# Patient Record
Sex: FEMALE | Race: WHITE | NOT HISPANIC OR LATINO | Employment: OTHER | URBAN - METROPOLITAN AREA
[De-identification: names, ages, dates, MRNs, and addresses within clinical notes are randomized per-mention and may not be internally consistent; named-entity substitution may affect disease eponyms.]

---

## 2023-02-03 ENCOUNTER — ANNUAL EXAM (OUTPATIENT)
Dept: OBGYN CLINIC | Facility: CLINIC | Age: 48
End: 2023-02-03

## 2023-02-03 VITALS
DIASTOLIC BLOOD PRESSURE: 68 MMHG | HEIGHT: 65 IN | SYSTOLIC BLOOD PRESSURE: 118 MMHG | WEIGHT: 129.8 LBS | BODY MASS INDEX: 21.63 KG/M2

## 2023-02-03 DIAGNOSIS — Z12.4 SCREENING FOR CERVICAL CANCER: ICD-10-CM

## 2023-02-03 DIAGNOSIS — Z12.11 SCREEN FOR COLON CANCER: ICD-10-CM

## 2023-02-03 DIAGNOSIS — Z12.31 BREAST CANCER SCREENING BY MAMMOGRAM: ICD-10-CM

## 2023-02-03 DIAGNOSIS — Z01.419 WOMEN'S ANNUAL ROUTINE GYNECOLOGICAL EXAMINATION: Primary | ICD-10-CM

## 2023-02-03 DIAGNOSIS — E06.3 HASHIMOTO'S THYROIDITIS: ICD-10-CM

## 2023-02-03 PROBLEM — Z97.5 IUD (INTRAUTERINE DEVICE) IN PLACE: Status: ACTIVE | Noted: 2023-02-03

## 2023-02-03 NOTE — PROGRESS NOTES
[address], [address], [facility], [address] [facility]    ASSESSMENT/PLAN: Edwin Lala is a 50 y o  [id number] who presents for annual gynecologic exam     Encounter for routine gynecologic examination  - Routine well woman exam completed today  - Cervical Cancer Screening: Current ASCCP Guidelines reviewed  Last Pap: 12/08/2020  History of abnormal: Yes  Overdue for repeat pap  Pap collected today   - STI screening offered including HIV testing: offered, pt declined  - Contraceptive counseling discussed  Current contraception: IUD, satisfied  Due for removal of IUD 1/2027:   - Breast Cancer Screening: Last Mammogram Not on file  Overdue for repeat  Order provided today  - Colorectal cancer screening was ordered  - The following were reviewed in today's visit: breast self exam, mammography screening ordered, menopause, exercise, healthy diet and colonoscopy discussed and ordered    Additional problems addressed during this visit:  1  Women's annual routine gynecological examination    2  Breast cancer screening by mammogram  -     Mammo screening bilateral w 3d & cad; Future    3  Screening for cervical cancer  -     IGP, Aptima HPV, Rfx 16/18,45    4  Hashimoto's thyroiditis  Assessment & Plan:  - Currently on no medications  Has not had TSH checked recently  Repeat ordered today  Reviewed that if abnormal, would recommend follow up with PCP for further management  Orders:  -     TSH, 3rd generation with Free T4 reflex; Future  -     TSH, 3rd generation with Free T4 reflex    5  Screen for colon cancer  -     Ambulatory Referral to Gastroenterology; Future      CC:  Annual Gynecologic Examination    HPI: Edwin Lala is a 50 y o  [id number] who presents for annual gynecologic examination  She is without complaint today  ROS: Negative except as noted in HPI    No LMP recorded  Patient has had an implant    Menstrual History  Period Pattern:  (Amenorrheic in setting of LNG-IUD)    She reports that she is not currently sexually active and has had partner(s) who are male  She reports using the following method of birth control/protection: I U D   Sexual History  Sexual Assault: No  Sexually Transmitted Infection History: None  Multiple Sex Partners: No  Is Patient in a Monogamous Relationship?: No    The following portions of the patient's history were reviewed and updated as appropriate:   Past Medical History:   Diagnosis Date   • Abnormal cervical Papanicolaou smear     leep times 2   • B12 deficiency    • Hashimoto's disease    • Hypothyroidism      Past Surgical History:   Procedure Laterality Date   • CERVICAL BIOPSY  W/ LOOP ELECTRODE EXCISION  2003   • CERVICAL BIOPSY  W/ LOOP ELECTRODE EXCISION  02/22/2017   • HI CONIZATION CERVIX W/WO D&C RPR KNIFE/LASER       Family History   Problem Relation Age of Onset   • No Known Problems Mother    • Heart disease Father    • Diabetes Father    • Prostate cancer Father    • Obesity Father    • Alcohol abuse Father    • No Known Problems Sister    • Alcohol abuse Brother    • Cirrhosis Brother    • COPD Maternal Grandfather    • Liver cancer Paternal Grandmother    • Prostate cancer Paternal Grandfather    • No Known Problems Daughter    • No Known Problems Son    • Allergies Son    • Breast cancer Neg Hx    • Ovarian cancer Neg Hx    • Uterine cancer Neg Hx    • Colon cancer Neg Hx      Social History     Socioeconomic History   • Marital status: /Civil Union     Spouse name: None   • Number of children: None   • Years of education: None   • Highest education level: None   Occupational History   • None   Tobacco Use   • Smoking status: Former     Types: Cigarettes   • Smokeless tobacco: Never   Vaping Use   • Vaping Use: Never used   Substance and Sexual Activity   • Alcohol use: Yes     Comment: special occasions   • Drug use: Never   • Sexual activity: Not Currently     Partners: Male     Birth control/protection: I U D     Other Topics Concern   • None   Social History Narrative   • None     Social Determinants of Health     Financial Resource Strain: Not on file   Food Insecurity: Not on file   Transportation Needs: Not on file   Physical Activity: Not on file   Stress: Not on file   Social Connections: Not on file   Intimate Partner Violence: Not on file   Housing Stability: Not on file     Outpatient Medications Marked as Taking for the 2/3/23 encounter (Annual Exam) with Chuyita Shen MD   Medication   • Levonorgestrel 20 1 MCG/DAY IUD     No Known Allergies        Objective:  /68 (BP Location: Left arm, Patient Position: Sitting, Cuff Size: Standard)   Ht 5' 4 75" (1 645 m)   Wt 58 9 kg (129 lb 12 8 oz)   BMI 21 77 kg/m²        Chaperone present? Yes: Micaela Cheema MA  General Appearance: alert and oriented, in no acute distress  Neck/Thyroid: No thyromegaly, no thyroid nodules  Respiratory: Unlabored breathing  Cardiovascular: Regular rate, no peripheral edema  Abdomen: Soft, non-tender, non-distended, no masses, no rebound or guarding  Breast Exam: No dimpling, nipple retraction or discharge  No lumps or masses  No axillary or supraclavicular nodes  Pelvic:       External genitalia: Normal appearance, no abnormal pigmentation, no lesions or masses  Normal [place]tholin's and [place]'s  Urinary system: Urethral meatus normal with atrophic changes, bladder non-tender  Vaginal: Atrophic mucosa without prolapse or lesions  Normal-appearing physiologic discharge  Cervix: Normal-appearing, well-epithelialized, no gross lesions or masses  No cervical motion tenderness  IUD strings visualized  Light bleeding occurred with collection of pap  Adnexa: No adnexal masses or tenderness noted  Uterus: Normal-sized, regular contour, midline, mobile, no uterine tenderness  Extremities: Normal range of motion  Warm, well-perfused, non-tender     Skin: normal, no rash or abnormalities  Neurologic: alert, oriented x3  Psychiatric: Appropriate affect, mood stable, cooperative with exam         Gerda Rangel MD  2/3/2023 9:43 AM

## 2023-02-03 NOTE — ASSESSMENT & PLAN NOTE
- Currently on no medications  Has not had TSH checked recently  Repeat ordered today  Reviewed that if abnormal, would recommend follow up with PCP for further management

## 2023-02-09 LAB
CYTOLOGIST CVX/VAG CYTO: NORMAL
DX ICD CODE: NORMAL
HPV GENOTYPE REFLEX: NORMAL
HPV I/H RISK 4 DNA CVX QL PROBE+SIG AMP: NEGATIVE
Lab: NORMAL
OTHER STN SPEC: NORMAL
PATH REPORT.FINAL DX SPEC: NORMAL
SL AMB NOTE:: NORMAL
SL AMB SPECIMEN ADEQUACY: NORMAL
SL AMB TEST METHODOLOGY: NORMAL

## 2023-03-04 LAB — TSH SERPL DL<=0.005 MIU/L-ACNC: 1.97 UIU/ML (ref 0.45–4.5)
